# Patient Record
Sex: MALE | Race: WHITE | NOT HISPANIC OR LATINO | Employment: FULL TIME | URBAN - NONMETROPOLITAN AREA
[De-identification: names, ages, dates, MRNs, and addresses within clinical notes are randomized per-mention and may not be internally consistent; named-entity substitution may affect disease eponyms.]

---

## 2018-04-08 ENCOUNTER — HOSPITAL ENCOUNTER (EMERGENCY)
Facility: HOSPITAL | Age: 63
Discharge: HOME | End: 2018-04-08
Attending: EMERGENCY MEDICINE | Admitting: EMERGENCY MEDICINE

## 2018-04-08 VITALS
OXYGEN SATURATION: 95 % | SYSTOLIC BLOOD PRESSURE: 139 MMHG | HEIGHT: 68 IN | TEMPERATURE: 97.6 F | RESPIRATION RATE: 18 BRPM | HEART RATE: 91 BPM | BODY MASS INDEX: 33.34 KG/M2 | DIASTOLIC BLOOD PRESSURE: 88 MMHG | WEIGHT: 220 LBS

## 2018-04-08 DIAGNOSIS — M79.2 NEUROPATHIC PAIN: ICD-10-CM

## 2018-04-08 DIAGNOSIS — B37.49 CANDIDIASIS OF PERINEUM: Primary | ICD-10-CM

## 2018-04-08 PROCEDURE — 99283 EMERGENCY DEPT VISIT LOW MDM: CPT

## 2018-04-08 RX ORDER — CLOTRIMAZOLE 1 G/ML
SOLUTION TOPICAL EVERY 12 HOURS SCHEDULED
Qty: 60 ML | Refills: 0 | Status: SHIPPED | OUTPATIENT
Start: 2018-04-08

## 2018-04-08 NOTE — ED PROVIDER NOTES
Subjective   The patient is a 62-year-old male presenting to the ED this afternoon with 2 complaints.  The first is a pruritic rash in the groin region which has been present for several months.  He said he had the exact same rash last year while he was living in [facility] and apparently was placed on some type of topical agent that cleared it up.  He denies any weeping from the area.  He says he sits often working as a  and this seems to make it worse.  He is not diabetic.    He complains of burning severe pain of both feet which has been present for the better part of 1-2 years.  He said he was placed on Neurontin at that time and it seemed to help.  He is out of the Neurontin because he moved to [place] many months ago and still hasn't established a PCP.  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  He ended up seeing neurology and ultimately came through with no permanent neurological symptoms from what I can tell other than this chronic sounding neuropathic pain of the feet.  I had no medical records to review here and all of this is from history talking to patient.  He denies any swelling, redness, drainage from the feet or legs.  The pain seems to be at rest, nocturnally and when he is up moving around.  Nothing really eases or worsens the pain except he does mention that gabapentin did help last year            Review of Systems   All other systems reviewed and are negative.      Past Medical History:   Diagnosis Date   • Diabetes mellitus    • Disease of thyroid gland    • Elevated liver enzymes    • Guillain Barré syndrome    • Hypertension    • Stroke        No Known Allergies    Past Surgical History:   Procedure Laterality Date   • HEMORRHOIDECTOMY         History reviewed. No pertinent family history.    Social History     Social History   • Marital status:      Social History Main Topics   • Smoking status: Never  Smoker   • Alcohol use No   • Drug use: No     Other Topics Concern   • Not on file           Objective   Physical Exam   Constitutional: He appears well-developed and well-nourished. No distress.   Very pleasant  male in no acute distress   HENT:   Head: Normocephalic.   Nose: Nose normal.   Mouth/Throat: Oropharynx is clear and moist.   Eyes: EOM are normal.   Cardiovascular: Normal rate, regular rhythm and normal heart sounds.    Pulmonary/Chest: Effort normal and breath sounds normal.   Abdominal: Soft. Bowel sounds are normal. He exhibits no distension.   Musculoskeletal: Normal range of motion. He exhibits no edema, tenderness or deformity.   Both of his feet are warm with no ulcerations, erythema or signs of infection.  Nails are thickened-mycotic.  Pulses and sensation are intact.  No edema of the ankles and normal weightbearing.   Neurological: He is alert. No cranial nerve deficit. He exhibits normal muscle tone. Coordination normal.   Skin: Capillary refill takes less than 2 seconds. Rash noted. He is not diaphoretic.   He has a moderate-sized rash of the anterior genital region encompassing the inner thighs bilaterally and perineum and back of the right and left thigh.  Well demarcated borders with no drainage or ulcerations   Psychiatric: He has a normal mood and affect. His behavior is normal. Thought content normal.   Vitals reviewed.      Procedures         ED Course  ED Course   Comment By Time   Patient here with 2 complaints which are both nonacute.  He has a candidal infection which I'm going to treat with powder and topical ointment.  I also discussed measures to employee to help with reducing any further involvement-worsening of this.  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  Nonetheless, he needs follow-up with a local PCP and off given him a referral.  His foot pain has been chronic and sounds neuropathic.  I explained that we  don't prescribe gabapentin for chronic neuropathic pain and that although it did help him previously he would need to establish a PCP here as well. Jeremy Solis PA-C 04/08 1827                  [facility]    Final diagnoses:   Candidiasis of perineum   Neuropathic pain            Jeremy Solis PA-C  04/08/18 [id number]

## 2018-04-08 NOTE — DISCHARGE INSTRUCTIONS
Please call [facility] tomorrow morning and make an appointment to be seen as a new patient as soon as possible.  Try to keep the area of your rash as dry as possible.